# Patient Record
Sex: MALE | Race: WHITE | NOT HISPANIC OR LATINO | Employment: STUDENT | URBAN - NONMETROPOLITAN AREA
[De-identification: names, ages, dates, MRNs, and addresses within clinical notes are randomized per-mention and may not be internally consistent; named-entity substitution may affect disease eponyms.]

---
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
## 2025-05-21 ENCOUNTER — OFFICE VISIT (OUTPATIENT)
Dept: FAMILY MEDICINE | Facility: CLINIC | Age: 20
End: 2025-05-21

## 2025-05-21 VITALS
BODY MASS INDEX: 20 KG/M2 | HEART RATE: 97 BPM | DIASTOLIC BLOOD PRESSURE: 83 MMHG | TEMPERATURE: 98 F | SYSTOLIC BLOOD PRESSURE: 128 MMHG | OXYGEN SATURATION: 98 % | WEIGHT: 132 LBS | HEIGHT: 68 IN | RESPIRATION RATE: 15 BRPM

## 2025-05-21 DIAGNOSIS — J02.9 SORE THROAT: Primary | ICD-10-CM

## 2025-05-21 LAB
CTP QC/QA: YES
MOLECULAR STREP A: NEGATIVE

## 2025-05-21 PROCEDURE — 87651 STREP A DNA AMP PROBE: CPT | Mod: QW,,, | Performed by: NURSE PRACTITIONER

## 2025-05-21 PROCEDURE — 99212 OFFICE O/P EST SF 10 MIN: CPT | Mod: ,,, | Performed by: NURSE PRACTITIONER

## 2025-05-21 NOTE — PROGRESS NOTES
[facility] Medicine    Chief Complaint      Chief Complaint   Patient presents with    Sore Throat     Patient reports he had a fever for one day 3 days ago and has since developed a sore throat.        History of Present Illness      Mark Terrazas III is a 20 y.o. male. He  has a past medical history of ADHD (attention deficit hyperactivity disorder) and Asthma., who presents today for ST x3 days.    Past Medical History:  Past Medical History:   Diagnosis Date    ADHD (attention deficit hyperactivity disorder)     Asthma        Past Surgical History:   has no past surgical history on file.    Social History:  Social History[1]    I personally reviewed all past medical, surgical, and social.     Review of Systems   Constitutional:  Positive for fever. Negative for chills.   HENT:  Positive for sore throat. Negative for congestion.    Respiratory:  Negative for cough.    Gastrointestinal:  Negative for diarrhea, nausea and vomiting.   Musculoskeletal:  Negative for myalgias.   Skin:  Negative for rash.   Neurological:  Negative for headaches.        Medications:  Encounter Medications[2]    Allergies:  Review of patient's allergies indicates:   Allergen Reactions    Pcn [penicillins]        Health Maintenance:  Immunization History   Administered Date(s) Administered    COVID-19 Vaccine 07/20/2021, 08/10/2021    DTP 03/24/2009    DTaP / Hep B / IPV 2005, 2005, 2005    DTaP / HiB 01/26/2006    HPV 9-Valent 09/16/2019, 01/13/2021    Hepatitis A, Pediatric/Adolescent, 2 Dose 03/24/2009, 03/29/2016    HiB PRP-OMP 2005, 2005    IPV 03/24/2009    MMR 01/26/2006, 03/24/2009    Meningococcal Conjugate (MCV4O) 2 Vial (2mo-55yr) 03/29/2016    Meningococcal Conjugate (MCV4P) 01/13/2021    Pneumococcal Conjugate - 7 Valent 2005, 2005, 2005, 01/26/2006    Tdap 03/29/2016    Varicella 01/26/2006, 03/24/2009      Health Maintenance   Topic Date Due    Hepatitis C Screening  Never  "done    Lipid Panel  Never done    HIV Screening  Never done    COVID-19 Vaccine (3 - 2024-25 season) 09/01/2024    Influenza Vaccine (Season Ended) 09/01/2025    TETANUS VACCINE  03/29/2026    RSV Vaccine (Age 60+ and Pregnant patients) (1 - 1-dose 75+ series) 01/05/2080    HPV Vaccines  Completed    Pneumococcal Vaccines (Age 0-49)  Aged Out        Physical Exam      Vital Signs  Temp: 97.7 °F (36.5 °C)  Temp Source: Oral  Pulse: 97  Resp: 15  SpO2: 98 %  BP: 128/83  BP Location: Left arm  Patient Position: Sitting  Pain Score:   6  Pain Loc: Throat  Height and Weight  Height: 5' 8" (172.7 cm)  Weight: 59.9 kg (132 lb)  BSA (Calculated - sq m): 1.69 sq meters  BMI (Calculated): 20.1  Weight in (lb) to have BMI = 25: 164.1]    Physical Exam  Vitals and nursing note reviewed.   Constitutional:       Appearance: Normal appearance.   HENT:      Head: Normocephalic.      Right Ear: Hearing normal.      Left Ear: Hearing normal.      Nose: Nose normal.      Mouth/Throat:      Pharynx: Posterior oropharyngeal erythema present.        Comments: Ulceration noted to L pharynx  Eyes:      General: Lids are normal.      Extraocular Movements: Extraocular movements intact.      Conjunctiva/sclera: Conjunctivae normal.      Pupils: Pupils are equal, round, and reactive to light.   Neck:      Thyroid: No thyromegaly.   Cardiovascular:      Rate and Rhythm: Normal rate and regular rhythm.      Heart sounds: Normal heart sounds.   Pulmonary:      Effort: Pulmonary effort is normal.      Breath sounds: Normal breath sounds.   Musculoskeletal:         General: Normal range of motion.      Cervical back: Normal range of motion and neck supple.      Right lower leg: No edema.      Left lower leg: No edema.   Skin:     General: Skin is warm and dry.      Findings: No rash.   Neurological:      General: No focal deficit present.      Mental Status: He is alert and oriented to person, place, and time.      Gait: Gait is intact.      " "    Laboratory:  CBC:      CMP:        Invalid input(s): "CREATININ"  LIPIDS:      TSH:      A1C:        Assessment/Plan     Mark Terrazas III is a 20 y.o.male with:     1. Sore throat  -     POCT Strep A, Molecular       May take OTC analgesics for pain      Total time spent face-to-face and non-face-to-face coordinating care for this encounter was: 10 min    Chronic conditions status updated as per HPI.  Other than changes above, cont current medications and maintain follow up with specialists.  Return to clinic prn if symptoms worsen or fail to improve.    María Elena Paredes, P  [facility]         [1]   Social History  Tobacco Use    Smoking status: Never     Passive exposure: Current    Smokeless tobacco: Never   Substance Use Topics    Alcohol use: Never   [2]   Outpatient Encounter Medications as of 5/21/2025   Medication Sig Dispense Refill    atomoxetine (STRATTERA) 40 MG capsule Take 1 capsule (40 mg total) by mouth once daily. (Patient not taking: Reported on 5/21/2025) 10 capsule 0    ondansetron (ZOFRAN) 4 MG tablet Take 1 tablet (4 mg total) by mouth every 6 (six) hours as needed for Nausea. (Patient not taking: Reported on 5/21/2025) 30 tablet 0     No facility-administered encounter medications on file as of 5/21/2025.     "

## 2025-07-14 ENCOUNTER — LAB VISIT (OUTPATIENT)
Dept: PRIMARY CARE CLINIC | Facility: CLINIC | Age: 20
End: 2025-07-14

## 2025-07-14 DIAGNOSIS — Z13.39 ALCOHOL SCREENING: ICD-10-CM

## 2025-07-14 DIAGNOSIS — Z02.83 ENCOUNTER FOR DRUG SCREENING: Primary | ICD-10-CM

## 2025-07-14 PROCEDURE — 99000 SPECIMEN HANDLING OFFICE-LAB: CPT | Mod: ,,, | Performed by: NURSE PRACTITIONER

## 2025-07-14 PROCEDURE — 82075 ASSAY OF BREATH ETHANOL: CPT | Mod: ,,, | Performed by: NURSE PRACTITIONER

## 2025-07-14 NOTE — PROGRESS NOTES
Patient ID: Mark Terrazas III is a 20 y.o. male.    Chief Complaint: No chief complaint on file.    History of Present Illness              Physical Exam              Assessment & Plan               1. Encounter for drug screening    2. Alcohol screening        No follow-ups on file.    This note was generated with the assistance of ambient listening technology. Verbal consent was obtained by the patient and accompanying visitor(s) for the recording of patient appointment to facilitate this note. I attest to having reviewed and edited the generated note for accuracy, though some syntax or spelling errors may persist. Please contact the author of this note for any clarification.